# Patient Record
Sex: FEMALE | Race: BLACK OR AFRICAN AMERICAN | Employment: UNEMPLOYED | URBAN - METROPOLITAN AREA
[De-identification: names, ages, dates, MRNs, and addresses within clinical notes are randomized per-mention and may not be internally consistent; named-entity substitution may affect disease eponyms.]

---

## 2020-02-03 PROBLEM — J10.1 INFLUENZA A: Status: ACTIVE | Noted: 2020-02-03

## 2020-03-12 ENCOUNTER — APPOINTMENT (OUTPATIENT)
Dept: GENERAL RADIOLOGY | Age: 65
End: 2020-03-12
Attending: EMERGENCY MEDICINE
Payer: MEDICARE

## 2020-03-12 ENCOUNTER — HOSPITAL ENCOUNTER (EMERGENCY)
Age: 65
Discharge: HOME | End: 2020-03-12
Attending: EMERGENCY MEDICINE
Payer: MEDICARE

## 2020-03-12 VITALS
HEIGHT: 59 IN | RESPIRATION RATE: 20 BRPM | DIASTOLIC BLOOD PRESSURE: 81 MMHG | WEIGHT: 150 LBS | SYSTOLIC BLOOD PRESSURE: 114 MMHG | BODY MASS INDEX: 30.24 KG/M2 | OXYGEN SATURATION: 100 % | HEART RATE: 103 BPM | TEMPERATURE: 98.6 F

## 2020-03-12 DIAGNOSIS — R07.81 RIB PAIN: Primary | ICD-10-CM

## 2020-03-12 PROCEDURE — 71046 X-RAY EXAM CHEST 2 VIEWS: CPT

## 2020-03-12 PROCEDURE — 99282 EMERGENCY DEPT VISIT SF MDM: CPT

## 2020-03-12 NOTE — ED PROVIDER NOTES
63 y/o homeless woman has complain tof right side rib pain from a fall 2 days - was sen at [facility] and had back x-rays negative - stats mild pain right side worse with movement - 3/10, sharp, no radiations - denies fever chills , nausea, vomtiing, diarrhea, shortness of breath    This note dictated in dragon software. There may be grammatical and spelling errors that are missed during review    Review of systems: all other systems negative unless otherwise specified             Past Medical History:   Diagnosis Date    Breast cancer ([facility] [address]     Hypertension     Prediabetes     Reflux gastritis        Past Surgical History:   Procedure Laterality Date    ABDOMEN SURGERY PROC UNLISTED      HX BACK SURGERY      HX LYMPHADENECTOMY           History reviewed. No pertinent family history.     Social History     Socioeconomic History    Marital status: LEGALLY      Spouse name: Not on file    Number of children: Not on file    Years of education: Not on file    Highest education level: Not on file   Occupational History    Not on file   Social Needs    Financial resource strain: Not on file    Food insecurity     Worry: Not on file     Inability: Not on file    Transportation needs     Medical: Not on file     Non-medical: Not on file   Tobacco Use    Smoking status: Never Smoker    Smokeless tobacco: Never Used   Substance and Sexual Activity    Alcohol use: No    Drug use: Not Currently     Types: Marijuana     Comment: used as pain management for [place]    Sexual activity: Not on file   Lifestyle    Physical activity     Days per week: Not on file     Minutes per session: Not on file    Stress: Not on file   Relationships    Social connections     Talks on phone: Not on file     Gets together: Not on file     Attends Synagogue service: Not on file     Active member of club or organization: Not on file     Attends meetings of clubs or organizations: Not on file     Relationship status: Not on file  Intimate partner violence     Fear of current or ex partner: Not on file     Emotionally abused: Not on file     Physically abused: Not on file     Forced sexual activity: Not on file   Other Topics Concern    Not on file   Social History Narrative    Not on file         ALLERGIES: Patient has no known allergies. Review of Systems   Constitutional: Negative for chills and fatigue. HENT: Negative for sore throat. Respiratory: Negative for cough and shortness of breath. Cardiovascular: Positive for chest pain. Gastrointestinal: Negative for abdominal pain. Musculoskeletal: Negative for back pain, gait problem, myalgias and neck pain. Skin: Negative for rash and wound. All other systems reviewed and are negative. Vitals:    03/12/20 0733   BP: 114/81   Pulse: (!) 103   Resp: 20   Temp: 98.6 °F (37 °C)   SpO2: 100%   Weight: 68 kg (150 lb)   Height: 4' 11\" (1.499 m)            Physical Exam  Vitals signs and nursing note reviewed. Constitutional:       General: She is not in acute distress. Appearance: She is not diaphoretic. HENT:      Head: Normocephalic and atraumatic. Mouth/Throat:      Pharynx: No oropharyngeal exudate or posterior oropharyngeal erythema. Eyes:      Extraocular Movements: Extraocular movements intact. Cardiovascular:      Rate and Rhythm: Normal rate and regular rhythm. Pulses: Normal pulses. Pulmonary:      Effort: Pulmonary effort is normal. No respiratory distress. Breath sounds: Normal breath sounds. Chest:      Chest wall: Tenderness present. Abdominal:      General: Bowel sounds are normal.      Palpations: Abdomen is soft. Skin:     General: Skin is warm. Neurological:      General: No focal deficit present. Mental Status: She is alert.    Psychiatric:         Mood and Affect: Mood normal.          MDM  Number of Diagnoses or Management Options  Rib pain:   Diagnosis management comments: 72-year-old female with fall 2 days ago, she was initially seen at [facility] facility with x-rays are negative now complains of right sided rib pain chest x-ray negative with reassuring vital signs will discharge home    Patient sleeping no complaints    CXR neg - discharge         Procedures

## 2020-03-12 NOTE — ED TRIAGE NOTES
[facility] the shelter. Pt states she hit head at forehead on the toilet seat and right side rib cage. Pt denies loc. Pt denies anticoagulants.